# Patient Record
Sex: FEMALE | Race: BLACK OR AFRICAN AMERICAN
[De-identification: names, ages, dates, MRNs, and addresses within clinical notes are randomized per-mention and may not be internally consistent; named-entity substitution may affect disease eponyms.]

---

## 2020-03-17 ENCOUNTER — HOSPITAL ENCOUNTER (OUTPATIENT)
Dept: HOSPITAL 88 - US | Age: 50
End: 2020-03-17
Attending: INTERNAL MEDICINE
Payer: MEDICARE

## 2020-03-17 DIAGNOSIS — R18.8: Primary | ICD-10-CM

## 2020-03-17 LAB
DEPRECATED APTT PLAS QN: 31.8 SECONDS (ref 23.8–35.5)
DEPRECATED INR PLAS: 1.04
HGB BLD-MCNC: 8.7 G/DL (ref 12–16)
PLATELET # BLD AUTO: 103 X10E3/UL (ref 140–360)
PROTHROMBIN TIME: 14.2 SECONDS (ref 11.9–14.5)

## 2020-03-17 PROCEDURE — 85014 HEMATOCRIT: CPT

## 2020-03-17 PROCEDURE — 85610 PROTHROMBIN TIME: CPT

## 2020-03-17 PROCEDURE — 85730 THROMBOPLASTIN TIME PARTIAL: CPT

## 2020-03-17 PROCEDURE — 76700 US EXAM ABDOM COMPLETE: CPT

## 2020-03-17 PROCEDURE — 85049 AUTOMATED PLATELET COUNT: CPT

## 2020-03-17 PROCEDURE — 36415 COLL VENOUS BLD VENIPUNCTURE: CPT

## 2020-03-17 NOTE — DIAGNOSTIC IMAGING REPORT
HISTORY:  

^[id number]

^[id number]



COMPARISON:  None.



TECHNIQUE:  Ultrasound examination of the abdomen was performed with spectral

and color Doppler imaging.



FINDINGS: 

Liver: The liver measures 17.9 cm and is echogenic and slightly nodular. No

focal lesions. The main portal vein measures 17.9 cm.

Biliary: Contracted. Patient reports eating at 0600. No stones. No sludge,

pericholecystic fluid or wall thickening.  Negative sonographic Echavarria's sign.

Common bile duct measures 0.3 cm. No intrahepatic biliary ductal dilatation.

Spleen: No splenomegaly.

Pancreas: Visualized portions are unremarkable. 

Kidneys: Echogenic right kidney. Atrophic left kidney. No hydronephrosis nor

sonographically evident solid mass lesion.

Midline Vessels: Visualized portions of the IVC and aorta are unremarkable.

Peritoneum: Small amount of anechoic ascites.



IMPRESSION:



1. Cirrhosis with small amount of ascites.

2. Echogenic right kidney and atrophic left kidney.

3. Contracted gallbladder.



Signed by: Ryan Miller MD on 3/17/2020 1:42 PM